# Patient Record
Sex: FEMALE | Race: ASIAN | URBAN - METROPOLITAN AREA
[De-identification: names, ages, dates, MRNs, and addresses within clinical notes are randomized per-mention and may not be internally consistent; named-entity substitution may affect disease eponyms.]

---

## 2017-06-27 ENCOUNTER — OFFICE VISIT (OUTPATIENT)
Dept: PEDIATRICS | Facility: CLINIC | Age: 32
End: 2017-06-27
Payer: COMMERCIAL

## 2017-06-27 ENCOUNTER — TELEPHONE (OUTPATIENT)
Dept: PEDIATRICS | Facility: CLINIC | Age: 32
End: 2017-06-27

## 2017-06-27 VITALS
TEMPERATURE: 97.2 F | DIASTOLIC BLOOD PRESSURE: 86 MMHG | HEART RATE: 86 BPM | BODY MASS INDEX: 26.45 KG/M2 | WEIGHT: 140.1 LBS | OXYGEN SATURATION: 100 % | HEIGHT: 61 IN | SYSTOLIC BLOOD PRESSURE: 114 MMHG

## 2017-06-27 DIAGNOSIS — R53.83 OTHER FATIGUE: ICD-10-CM

## 2017-06-27 DIAGNOSIS — E03.9 HYPOTHYROIDISM, UNSPECIFIED TYPE: Primary | ICD-10-CM

## 2017-06-27 DIAGNOSIS — L65.9 HAIR LOSS: Primary | ICD-10-CM

## 2017-06-27 LAB
BASOPHILS # BLD AUTO: 0 10E9/L (ref 0–0.2)
BASOPHILS NFR BLD AUTO: 0.5 %
DEPRECATED CALCIDIOL+CALCIFEROL SERPL-MC: 35 UG/L (ref 20–75)
DIFFERENTIAL METHOD BLD: NORMAL
EOSINOPHIL # BLD AUTO: 0.3 10E9/L (ref 0–0.7)
EOSINOPHIL NFR BLD AUTO: 4.4 %
ERYTHROCYTE [DISTWIDTH] IN BLOOD BY AUTOMATED COUNT: 12.8 % (ref 10–15)
HCT VFR BLD AUTO: 37.1 % (ref 35–47)
HGB BLD-MCNC: 12.3 G/DL (ref 11.7–15.7)
LYMPHOCYTES # BLD AUTO: 2 10E9/L (ref 0.8–5.3)
LYMPHOCYTES NFR BLD AUTO: 32.4 %
MCH RBC QN AUTO: 26.7 PG (ref 26.5–33)
MCHC RBC AUTO-ENTMCNC: 33.2 G/DL (ref 31.5–36.5)
MCV RBC AUTO: 81 FL (ref 78–100)
MONOCYTES # BLD AUTO: 0.3 10E9/L (ref 0–1.3)
MONOCYTES NFR BLD AUTO: 4.9 %
NEUTROPHILS # BLD AUTO: 3.6 10E9/L (ref 1.6–8.3)
NEUTROPHILS NFR BLD AUTO: 57.8 %
PLATELET # BLD AUTO: 221 10E9/L (ref 150–450)
RBC # BLD AUTO: 4.6 10E12/L (ref 3.8–5.2)
T4 FREE SERPL-MCNC: 0.39 NG/DL (ref 0.76–1.46)
TSH SERPL DL<=0.005 MIU/L-ACNC: 78.5 MU/L (ref 0.4–4)
WBC # BLD AUTO: 6.2 10E9/L (ref 4–11)

## 2017-06-27 PROCEDURE — 85025 COMPLETE CBC W/AUTO DIFF WBC: CPT | Performed by: NURSE PRACTITIONER

## 2017-06-27 PROCEDURE — 36415 COLL VENOUS BLD VENIPUNCTURE: CPT | Performed by: NURSE PRACTITIONER

## 2017-06-27 PROCEDURE — 84443 ASSAY THYROID STIM HORMONE: CPT | Performed by: NURSE PRACTITIONER

## 2017-06-27 PROCEDURE — 82306 VITAMIN D 25 HYDROXY: CPT | Performed by: NURSE PRACTITIONER

## 2017-06-27 PROCEDURE — 99203 OFFICE O/P NEW LOW 30 MIN: CPT | Performed by: NURSE PRACTITIONER

## 2017-06-27 PROCEDURE — 84439 ASSAY OF FREE THYROXINE: CPT | Performed by: NURSE PRACTITIONER

## 2017-06-27 RX ORDER — LEVOTHYROXINE SODIUM 100 UG/1
100 TABLET ORAL DAILY
Qty: 90 TABLET | Refills: 0 | Status: SHIPPED | OUTPATIENT
Start: 2017-06-27 | End: 2017-10-09

## 2017-06-27 NOTE — LETTER
[facility]  [address]  [place] [address]                  [phone]   June 28, 2017    Ene Ng  [address]   [place] [id number]      Dear Ene,    Here is a summary of your recent test results:    Pleasure to meet you.     Your vitamin D is normal. Your hemoglobin is normal.     Your thyroid is very low. Please re-start the medication and come in for labs in 8 weeks. I think you should be feeling much better.     Again, I would encourage your family to try to get connected with other families. I think social isolation can contribute to feelings of depression.     Your test results are enclosed.      Please contact me if you have any questions.           Thank you very much for choosing [facility]    Best regards,    Cyndi Chamberlain, CNP        Results for orders placed or performed in visit on 06/27/17   TSH with free T4 reflex   Result Value Ref Range    TSH 78.50 (H) 0.40 - 4.00 mU/L   CBC with platelets differential   Result Value Ref Range    WBC 6.2 4.0 - 11.0 10e9/L    RBC Count 4.60 3.8 - 5.2 10e12/L    Hemoglobin 12.3 11.7 - 15.7 g/dL    Hematocrit 37.1 35.0 - 47.0 %    MCV 81 78 - 100 fl    MCH 26.7 26.5 - 33.0 pg    MCHC 33.2 31.5 - 36.5 g/dL    RDW 12.8 10.0 - 15.0 %    Platelet Count 221 150 - 450 10e9/L    Diff Method Automated Method     % Neutrophils 57.8 %    % Lymphocytes 32.4 %    % Monocytes 4.9 %    % Eosinophils 4.4 %    % Basophils 0.5 %    Absolute Neutrophil 3.6 1.6 - 8.3 10e9/L    Absolute Lymphocytes 2.0 0.8 - 5.3 10e9/L    Absolute Monocytes 0.3 0.0 - 1.3 10e9/L    Absolute Eosinophils 0.3 0.0 - 0.7 10e9/L    Absolute Basophils 0.0 0.0 - 0.2 10e9/L   Vitamin D Deficiency   Result Value Ref Range    Vitamin D Deficiency screening 35 20 - 75 ug/L   T4 free   Result Value Ref Range    T4 Free 0.39 (L) 0.76 - 1.46 ng/dL

## 2017-06-27 NOTE — MR AVS SNAPSHOT
"              After Visit Summary   2017    Ene Ng    MRN: [id number]           Patient Information     Date Of Birth          1985        Visit Information        Provider Department      2017 8:20 AM Cyndi Chamberlain APRN CNP [facility] [place]        Today's Diagnoses     Hair loss    -  1    Other fatigue           Follow-ups after your visit        Who to contact     If you have questions or need follow up information about today's clinic visit or your schedule please contact [facility] directly at [phone].  Normal or non-critical lab and imaging results will be communicated to you by [employer] Inchart, letter or phone within 4 business days after the clinic has received the results. If you do not hear from us within 7 days, please contact the clinic through [employer] Inchart or phone. If you have a critical or abnormal lab result, we will notify you by phone as soon as possible.  Submit refill requests through [employer] or call your pharmacy and they will forward the refill request to us. Please allow 3 business days for your refill to be completed.          Additional Information About Your Visit        MyChart Information     [employer] lets you send messages to your doctor, view your test results, renew your prescriptions, schedule appointments and more. To sign up, go to [email or web address] . Click on \"Log in\" on the left side of the screen, which will take you to the Welcome page. Then click on \"Sign up Now\" on the right side of the page.     You will be asked to enter the access code listed below, as well as some personal information. Please follow the directions to create your username and password.     Your access code is: [email or web address]  Expires: 2017  9:44 AM     Your access code will  in 90 days. If you need help or a new code, please call your [facility] or [phone].        Care EveryWhere ID     This is your Care EveryWhere ID. This could be used " "by other organizations to access your [place] medical records  [id number]        Your Vitals Were     Pulse Temperature Height Last Period Pulse Oximetry Breastfeeding?    86 97.2  F (36.2  C) (Tympanic) 5' 0.5\" (1.537 m) 06/17/2017 (Approximate) 100% No    BMI (Body Mass Index)                   26.91 kg/m2            Blood Pressure from Last 3 Encounters:   06/27/17 114/86    Weight from Last 3 Encounters:   06/27/17 140 lb 1.6 oz (63.5 kg)              We Performed the Following     CBC with platelets differential     TSH with free T4 reflex     Vitamin D Deficiency        Primary Care Provider    None Specified       No primary provider on file.        Equal Access to Services     JOVANA HICKS : Jamin Ruiz, roger johnston, raymon matthews, lorenzo zee . So [facility] [phone].    ATENCIÓN: Si habla español, tiene a dow disposición servicios gratuitos de asistencia lingüística. Llame al [phone].    We comply with applicable federal civil rights laws and [place] laws. We do not discriminate on the basis of race, color, national origin, age, disability sex, sexual orientation or gender identity.            Thank you!     Thank you for choosing [facility] [place]  for your care. Our goal is always to provide you with excellent care. Hearing back from our patients is one way we can continue to improve our services. Please take a few minutes to complete the written survey that you may receive in the mail after your visit with us. Thank you!             Your Updated Medication List - Protect others around you: Learn how to safely use, store and throw away your medicines at www.disposemymeds.org.          This list is accurate as of: 6/27/17  9:44 AM.  Always use your most recent med list.                   Brand Name Dispense Instructions for use Diagnosis    SYNTHROID PO      Take 88 mcg by mouth daily    Hair loss         "

## 2017-06-27 NOTE — PROGRESS NOTES
"  SUBJECTIVE:                                                    Ene Ng is a 31 year old female who presents to clinic today with  & son for the following health issues:    Thyroid problem  Losing hair2      Duration: 4 years, in 2013- Thryoid, Past 6 months- loss of hair    Description (location/character/radiation): Pt states that she feels depressed. Pt's  states that she has lot quite a bit of hair that is noticeable. Patient also states that she has some pain on her head and is itchy at times.     Intensity:  6/10- when head is hurting from hairloss    Accompanying signs and symptoms: heaches with durations lasting from 1-5 hours. Patient takes ibuprofen to alleviate pain.     History (similar episodes/previous evaluation): None    Precipitating or alleviating factors: None     Therapies tried and outcome: patient has used ibuprofen for headaches and coconut oil for itchy head and headache. Outcome-effective.   Hx hypothyroidism for several years. Stopped thyroid medicine 1 month ago because she ran out. However, since childbirth she has been fatigued and has gained weight.    Headaches are mild, twice per week.      ROS: const/derm/neuro otherwise negative     OBJECTIVE:  /86 (BP Location: Right arm, Cuff Size: Adult Regular)  Pulse 86  Temp 97.2  F (36.2  C) (Tympanic)  Ht 5' 0.5\" (1.537 m)  Wt 140 lb 1.6 oz (63.5 kg)  LMP 06/17/2017 (Approximate)  SpO2 100%  Breastfeeding? No  BMI 26.91 kg/m2  CONSTITUTIONAL: Alert, well-nourished, well-groomed, NAD  RESP: Lungs CTA. No wheeze, rhonchi, rales.  CV: HRRR S1 S2 No MRG. No peripheral edema  GI: Abdomen flat. BS x 4. No TTP. No HSM or masses. No CVAT  Neck: No lymphadenopathy or masses. Thyroid smooth, non-tender, and non-enlarged.      ASSESSMENT/PLAN:  (L65.9) Hair loss  (primary encounter diagnosis)  Comment: Likely telogen effluvium secondary to childbirth. Low thyroid hormones could also contribute. No evidence of " large areas of alopecia or scalp infection.   Plan: Levothyroxine Sodium (SYNTHROID PO), TSH with         free T4 reflex, CBC with platelets differential            (R53.83) Other fatigue  Comment: DDX includes postpartum depression, hypothyroidism, anemia, vitamin D deficiency, and sleep deprivation.   Plan: TSH with free T4 reflex, CBC with platelets         differential        F/U 1 month to re-evaluate for depression. Discussed ways to connect her socially with friends and family as she is socially isolated.           Cyndi Chamberlain, FNP-DNP.

## 2017-06-27 NOTE — NURSING NOTE
"Chief Complaint   Patient presents with     Thyroid Problem     losing hair       Initial /86 (BP Location: Right arm, Cuff Size: Adult Regular)  Pulse 86  Temp 97.2  F (36.2  C) (Tympanic)  Ht 5' 0.5\" (1.537 m)  Wt 140 lb 1.6 oz (63.5 kg)  LMP 06/17/2017 (Approximate)  SpO2 100%  Breastfeeding? No  BMI 26.91 kg/m2 Estimated body mass index is 26.91 kg/(m^2) as calculated from the following:    Height as of this encounter: 5' 0.5\" (1.537 m).    Weight as of this encounter: 140 lb 1.6 oz (63.5 kg).  Medication Reconciliation: complete   SMA George  "

## 2017-06-27 NOTE — TELEPHONE ENCOUNTER
Please call and let patient know thyroid is very low. Needs to re-start thyroid meds at 100mcg this time. Take EVERY day on empty stomach. Re-check in 8 weeks.    I'll get back to her about the other labs.     Mello,    Cyndi Chamberlain, FNP-DNP.

## 2017-06-28 ASSESSMENT — PATIENT HEALTH QUESTIONNAIRE - PHQ9: SUM OF ALL RESPONSES TO PHQ QUESTIONS 1-9: 10

## 2017-06-28 NOTE — TELEPHONE ENCOUNTER
I LVM to CB if patient calls back please tell her the info bellow per Cyndi Chamberlain.    Jennyfer Marcelino MA

## 2017-08-03 ENCOUNTER — TELEPHONE (OUTPATIENT)
Dept: PEDIATRICS | Facility: CLINIC | Age: 32
End: 2017-08-03

## 2017-08-03 NOTE — TELEPHONE ENCOUNTER
Panel Management Review      Patient has the following on her problem list: None      Composite cancer screening  Chart review shows that this patient is due/due soon for the following Pap Smear  Summary:    Patient is due/failing the following:   PAP    Action needed:   Patient needs office visit for pap.    Type of outreach:    Phone, spoke to patient.  Patient states had pap when pregnant at [facility] in [place] December 2016 - normal result.    Questions for provider review:    None                                                                                                                                    Delia Wylie CMA    Chart closed .

## 2017-10-09 ENCOUNTER — TELEPHONE (OUTPATIENT)
Dept: PEDIATRICS | Facility: CLINIC | Age: 32
End: 2017-10-09

## 2017-10-09 ENCOUNTER — OFFICE VISIT (OUTPATIENT)
Dept: PEDIATRICS | Facility: CLINIC | Age: 32
End: 2017-10-09
Payer: COMMERCIAL

## 2017-10-09 VITALS
DIASTOLIC BLOOD PRESSURE: 62 MMHG | HEIGHT: 61 IN | TEMPERATURE: 97.4 F | SYSTOLIC BLOOD PRESSURE: 98 MMHG | HEART RATE: 86 BPM | BODY MASS INDEX: 26.19 KG/M2 | OXYGEN SATURATION: 99 % | WEIGHT: 138.7 LBS

## 2017-10-09 DIAGNOSIS — E03.9 HYPOTHYROIDISM, UNSPECIFIED TYPE: ICD-10-CM

## 2017-10-09 DIAGNOSIS — Z23 NEED FOR PROPHYLACTIC VACCINATION AND INOCULATION AGAINST INFLUENZA: ICD-10-CM

## 2017-10-09 DIAGNOSIS — S00.83XA CONTUSION OF FACE, INITIAL ENCOUNTER: ICD-10-CM

## 2017-10-09 DIAGNOSIS — F32.9 SINGLE CURRENT EPISODE OF MAJOR DEPRESSIVE DISORDER, UNSPECIFIED DEPRESSION EPISODE SEVERITY: ICD-10-CM

## 2017-10-09 DIAGNOSIS — R53.83 OTHER FATIGUE: Primary | ICD-10-CM

## 2017-10-09 PROCEDURE — 90686 IIV4 VACC NO PRSV 0.5 ML IM: CPT | Performed by: NURSE PRACTITIONER

## 2017-10-09 PROCEDURE — 90471 IMMUNIZATION ADMIN: CPT | Performed by: NURSE PRACTITIONER

## 2017-10-09 PROCEDURE — 99214 OFFICE O/P EST MOD 30 MIN: CPT | Mod: 25 | Performed by: NURSE PRACTITIONER

## 2017-10-09 RX ORDER — LEVOTHYROXINE SODIUM 100 UG/1
100 TABLET ORAL DAILY
Qty: 90 TABLET | Refills: 0 | Status: SHIPPED | OUTPATIENT
Start: 2017-10-09

## 2017-10-09 ASSESSMENT — PATIENT HEALTH QUESTIONNAIRE - PHQ9: SUM OF ALL RESPONSES TO PHQ QUESTIONS 1-9: 15

## 2017-10-09 NOTE — TELEPHONE ENCOUNTER
Called and left VM to contact clinic with which language is their native language in [place].  Left message that it is for updating records.  Delia Wylie, CMA

## 2017-10-09 NOTE — TELEPHONE ENCOUNTER
The only language listed in her chart is English. I do not know which Ivorian language they speak.  How do you want to handle this?  Delia Wylie, CMA

## 2017-10-09 NOTE — MR AVS SNAPSHOT
After Visit Summary   10/9/2017    Ene Ng    MRN: [id number]           Patient Information     Date Of Birth          1985        Visit Information        Provider Department      10/9/2017 1:40 PM Cyndi Chamberlain APRN [facility]        Today's Diagnoses     Other fatigue    -  1    Need for prophylactic vaccination and inoculation against influenza        Hypothyroidism, unspecified type          Care Instructions    1. Take the thyroid medicine  2. Take vitamin D3 2,000 iu per day        Pterygium    A pterygium is a noncancerous growth that starts in the clear, thin tissue over the eye (conjunctiva). This fleshy growth covers the white part of the eye (sclera) and grows onto the clear part of the eye (cornea). One or both eyes may be affected. A pterygium may have no symptoms, or it may cause eye irritation. This may include itching and redness. If the growth is large, it can cause vision problems. Most of the time a pterygium is harmless, and no treatment is needed. If vision is affected, the growth should be removed.  It is not known exactly what causes a pterygium. Exposure of the eyes to the sun (UV-light exposure), dust, wind, and low humidity are thought to be factors. People who spend a lot of time in the sun, or who live in places where the sun s rays are intense, may have an increased risk of developing pterygia. To protect your eyes, wear sunglasses with side shields that block 100% of UV rays when outside. This may help prevent the condition from coming back or getting worse. It is also helpful to wear a wide-brimmed hat when outside.  Home care  If you have a pterygium, over-the-counter eye drops may be recommended. These help soothe inflammation and dryness.  Follow-up care  Follow up with your healthcare provider, or as advised. If you notice changes in your vision, contact an eye doctor (ophthalmologist) right away.  When to seek medical  advice  Call your healthcare provider right away if any of these occur:    Changes in your vision    Bleeding from your eyes    Eye pain    New eye problems  Date Last Reviewed: 6/14/2015 2000-2017 The [employer]. [address] [place] [address], [place] [address]. All rights reserved. This information is not intended as a substitute for professional medical care. Always follow your healthcare professional's instructions.        Treatment for a Pterygium     Sun protection, such as sunglasses and hats, can help prevent a pterygium.     A pterygium ([id number]-GCR-nb-um) is a type of growth on the eye. It is not cancer. It is most often only a minor problem unless it causes changes in your eyesight. Pterygia (the plural form of the word) are common in mg climates and in people who do outdoor work.  Types of treatment  If your pterygium is not causing any symptoms, it may not need treatment. If symptoms develop, your doctor might treat you with:    Over-the-counter eye drops, eye gel, or eye ointment to help with redness or irritation    Prescription eye drops, eye gel, or eye ointment    Surgery  Only surgery can remove a pterygium. But other treatments may help ease symptoms. Your doctor may be more likely to advise surgery if:    The pterygium is causing eyesight problems    The pterygium is getting larger    You can t move your eye normally    You have severe eye irritation that won t go away with other treatment    Your eye s appearance bothers you a lot  A pterygium will often grow back after it s removed with surgery. This may be more likely if you are under age 40. In some cases the pterygium that grows back can cause worse symptoms than the first one. It may be even more difficult to remove the new pterygium. This is why a pterygium is not often removed with surgery unless it causes severe symptoms.  A pterygium may be less likely to return if you have other treatments in addition to surgery. These  treatments slow the growth of your cells in the area and may help prevent future growth there. The treatments include mitomycin C (MMC) and beta irradiation. These treatments have their own risks. Your doctor will talk with you about the risks and benefits of all treatments.  Preventing a pterygium  Not all pterygia can be prevented. But you can decrease your risk by reducing sun exposure to your eyes. Wear sunglasses and hats when you are outside. Make sure your sunglasses block both ultraviolet A and ultraviolet B rays.  When to call your healthcare provider  Call your healthcare provider right away if you have any of these:    Decreased eyesight    Symptoms that get worse    New symptoms   Date Last Reviewed: 8/10/2015    2781-7622 [employer]. [address] [place] [address], [place] [id number]. All rights reserved. This information is not intended as a substitute for professional medical care. Always follow your healthcare professional's instructions.                Follow-ups after your visit        Who to contact     If you have questions or need follow up information about today's clinic visit or your schedule please contact [facility] directly at [phone].  Normal or non-critical lab and imaging results will be communicated to you by [employer], letter or phone within 4 business days after the clinic has received the results. If you do not hear from us within 7 days, please contact the clinic through [employer] or phone. If you have a critical or abnormal lab result, we will notify you by phone as soon as possible.  Submit refill requests through [employer] or call your pharmacy and they will forward the refill request to us. Please allow 3 business days for your refill to be completed.          Additional Information About Your Visit        [employer] Information     [employer] lets you send messages to your doctor, view your test results, renew your prescriptions, schedule appointments and more. To  "sign up, go to [email or web address] . Click on \"Log in\" on the left side of the screen, which will take you to the Welcome page. Then click on \"Sign up Now\" on the right side of the page.     You will be asked to enter the access code listed below, as well as some personal information. Please follow the directions to create your username and password.     Your access code is: [email or web address]  Expires: 2018  2:27 PM     Your access code will  in 90 days. If you need help or a new code, please call your [place] clinic or [phone].        Care EveryWhere ID     This is your Care EveryWhere ID. This could be used by other organizations to access your [place] medical records  [id number]        Your Vitals Were     Pulse Temperature Height Pulse Oximetry BMI (Body Mass Index)       86 97.4  F (36.3  C) (Tympanic) 5' 0.5\" (1.537 m) 99% 26.64 kg/m2        Blood Pressure from Last 3 Encounters:   10/09/17 98/62   17 114/86    Weight from Last 3 Encounters:   10/09/17 138 lb 11.2 oz (62.9 kg)   17 140 lb 1.6 oz (63.5 kg)              We Performed the Following     FLU VAC, SPLIT VIRUS IM > 3 YO (QUADRIVALENT) [12810]     Vaccine Administration, Initial [69439]          Where to get your medicines      These medications were sent to [id number]  [place], [place] - [address]  AT [address]  [address] [place] [address]     Phone:  [phone]     levothyroxine 100 MCG tablet          Primary Care Provider    None Specified       No primary provider on file.        Equal Access to Services     JOVANA HICKS : roger Vee, lorenzo coello. So [facility] [phone].    ATENCIÓN: Si habla español, tiene a dow disposición servicios gratuitos de asistencia lingüística. Lltran al [phone].    We comply with applicable federal civil rights laws and [place] laws. We do not " discriminate on the basis of race, color, national origin, age, disability, sex, sexual orientation, or gender identity.            Thank you!     Thank you for choosing [facility] [place]  for your care. Our goal is always to provide you with excellent care. Hearing back from our patients is one way we can continue to improve our services. Please take a few minutes to complete the written survey that you may receive in the mail after your visit with us. Thank you!             Your Updated Medication List - Protect others around you: Learn how to safely use, store and throw away your medicines at www.disposemymeds.org.          This list is accurate as of: 10/9/17  2:27 PM.  Always use your most recent med list.                   Brand Name Dispense Instructions for use Diagnosis    * SYNTHROID PO      Take 88 mcg by mouth daily    Hair loss       * levothyroxine 100 MCG tablet    SYNTHROID/LEVOTHROID    90 tablet    Take 1 tablet (100 mcg) by mouth daily    Hypothyroidism, unspecified type       * Notice:  This list has 2 medication(s) that are the same as other medications prescribed for you. Read the directions carefully, and ask your doctor or other care provider to review them with you.

## 2017-10-09 NOTE — TELEPHONE ENCOUNTER
Patient seen in clinic yesterday with a bruise on her face: Through her hubby as  he says that she fell. I wanted to call her back tomorrow via  and talk to her while her  is at work. I want to ask the standard safety questions.    Please huddle with me Tuesday morning to find a good time. I'd like staff to call through  then transfer the call to me so we can discuss.

## 2017-10-09 NOTE — NURSING NOTE
"Chief Complaint   Patient presents with     Musculoskeletal Problem     \"bone pain\"       Initial BP 98/62 (Cuff Size: Adult Regular)  Pulse 86  Temp 97.4  F (36.3  C) (Tympanic)  Ht 5' 0.5\" (1.537 m)  Wt 138 lb 11.2 oz (62.9 kg)  SpO2 99%  BMI 26.64 kg/m2 Estimated body mass index is 26.64 kg/(m^2) as calculated from the following:    Height as of this encounter: 5' 0.5\" (1.537 m).    Weight as of this encounter: 138 lb 11.2 oz (62.9 kg).  Medication Reconciliation: complete   Delia Wylie CMA    "

## 2017-10-09 NOTE — TELEPHONE ENCOUNTER
Please call [facility] and ask all languages they speak (likely speak Mandaeism and another local language). Please let them know it is for our records

## 2017-10-09 NOTE — PROGRESS NOTES
SUBJECTIVE:   Ene Ng is a 32 year old female who presents to clinic today for the following health issues:    Is not taking levothyroxine - thinks it is too high dose.    Musculoskeletal problem/pain      Duration: 1 week    Description  Location: shoulders, arms - wondering if related to thyroid, fatigue    Intensity:  moderate    Accompanying signs and symptoms: pain when lifting any weight - otherwise no pain    History  Previous similar problem: no   Previous evaluation:  none    Precipitating or alleviating factors:  Trauma or overuse: no   Aggravating factors include: lifting    Therapies tried and outcome: nothing: Symptoms not alleviated      Patient reports all information via  acting as .   reports that patient used to cry a lot, even before having a baby.   Since having a baby, she cries every day. She has no friends here. She med once with her 's friend's wife who also speaks her language and has a small child. No family here. She apparently feels strongly that her  doesn't spend enough time with she and her infant son. Reports he works every day until about 5 but then takes phone calls (not work related), watches movies, and doesn't listen to her when she is talking to him during a movie. He states he knows this is his fault and he can fix it. He works about 40 hours per week. She reports her  is the only one she wants to spend time with.     States she has thoughts that she would be better off dead but has never thought about a plan whatsoever. States she would never do that to her family.    Hasn't been taking vitamin D.    Stopped thyroid medicine 10 days ago because she wasn't sure she was supposed to be taking that dose. Has been feeling extremely tired since then.    States she fell down the stairs and hit her face, which is why she has the bruising on her face.     ROS: const/endo/pscyh otherwise negative     OBJECTIVE:  BP 98/62 (Cuff  "Size: Adult Regular)  Pulse 86  Temp 97.4  F (36.3  C) (Tympanic)  Ht 5' 0.5\" (1.537 m)  Wt 138 lb 11.2 oz (62.9 kg)  SpO2 99%  BMI 26.64 kg/m2  CONSTITUTIONAL: Alert, well-nourished, well-groomed, NAD  RESP: Lungs CTA. No wheeze, rhonchi, rales.  CV: HRRR S1 S2 No MRG. No peripheral edema  PSYCH: Flat affect. Appears irritable. Appropriate mentation and speech.   DERM: Right sided facial bruise over right cheek and temple, about 3.5 cm x 3.5 cm. Appears to be almost petechiae.     ASSESSMENT/PLAN:  (R53.83) Other fatigue  (primary encounter diagnosis)  Comment: Patient with fatigue for 10 days, acute on chronic fatigue. Likely due to stopping her levothyroxine 10 days ago. However, I think this patient has other contributing factors including depression, which was diagnosed today. See below. Also with borderline low vitamin D, which I have instructed her to start replacing.   Plan: Education regarding levothyroxine dosing. Re-start today.  Start 2,000 iu vitamin D3 per day.  Depression as below.  F/U 1 week.     (E03.9) Hypothyroidism, unspecified type  Comment: As above.  Plan: levothyroxine (SYNTHROID/LEVOTHROID) 100 MCG         tablet            (F32.9) Single current episode of major depressive disorder, unspecified depression episode severity  Comment: New diagnosis today with PHQ15. She has thoughts of being better off dead but states she would never kill herself and never thinks about a specific plan to hurt herself. The majority of her depression sx seem to be fixated on not spending enough time with her . See above. She seems to be very socially isolated.   Plan: Recommended therapy-pt declined  Strongly recommended building her social network.  I'm a bit concerned about her relationship with her . Given bruising on her face I'd like to call her tomorrow when not with her  and make sure she feels safe at home.     (S00.83XA) Contusion of face, initial encounter  Comment: Per " patient, fell down the stairs, which is a feasible story. Unable to assess for safety at home due to  being the .  Plan: Will call with  tomorrow to assess for safety via .           MADHURI De Souza-RUFINO.            Injectable Influenza Immunization Documentation    1.  Is the person to be vaccinated sick today?   No    2. Does the person to be vaccinated have an allergy to a component   of the vaccine?   No    3. Has the person to be vaccinated ever had a serious reaction   to influenza vaccine in the past?   No    4. Has the person to be vaccinated ever had Guillain-Barré syndrome?   No    Form completed by Delia Wylie [facility]

## 2017-10-10 NOTE — TELEPHONE ENCOUNTER
Called and left VM message to contact clinic with native language and get another contact number - does Gema have cell phone of her own?  Delia Wylie, CMA

## 2017-10-19 PROBLEM — F32.9 SINGLE CURRENT EPISODE OF MAJOR DEPRESSIVE DISORDER, UNSPECIFIED DEPRESSION EPISODE SEVERITY: Status: ACTIVE | Noted: 2017-10-19

## 2017-10-19 PROBLEM — E03.9 HYPOTHYROIDISM, UNSPECIFIED TYPE: Status: ACTIVE | Noted: 2017-10-09

## 2017-10-19 PROBLEM — E03.9 HYPOTHYROIDISM, UNSPECIFIED TYPE: Status: ACTIVE | Noted: 2017-10-19

## 2017-12-12 ENCOUNTER — OFFICE VISIT (OUTPATIENT)
Dept: PEDIATRICS | Facility: CLINIC | Age: 32
End: 2017-12-12
Payer: COMMERCIAL

## 2017-12-12 VITALS
OXYGEN SATURATION: 99 % | HEART RATE: 94 BPM | BODY MASS INDEX: 26.38 KG/M2 | HEIGHT: 61 IN | TEMPERATURE: 97.7 F | WEIGHT: 139.7 LBS | DIASTOLIC BLOOD PRESSURE: 62 MMHG | SYSTOLIC BLOOD PRESSURE: 102 MMHG

## 2017-12-12 DIAGNOSIS — N94.6 PAINFUL MENSTRUAL PERIODS: ICD-10-CM

## 2017-12-12 DIAGNOSIS — R10.11 RUQ ABDOMINAL PAIN: ICD-10-CM

## 2017-12-12 DIAGNOSIS — E03.9 HYPOTHYROIDISM, UNSPECIFIED TYPE: ICD-10-CM

## 2017-12-12 DIAGNOSIS — F32.A DEPRESSION, UNSPECIFIED DEPRESSION TYPE: Primary | ICD-10-CM

## 2017-12-12 PROCEDURE — 99215 OFFICE O/P EST HI 40 MIN: CPT | Performed by: NURSE PRACTITIONER

## 2017-12-12 RX ORDER — IBUPROFEN 800 MG/1
800 TABLET, FILM COATED ORAL EVERY 8 HOURS PRN
Qty: 90 TABLET | Refills: 1 | Status: SHIPPED | OUTPATIENT
Start: 2017-12-12

## 2017-12-12 ASSESSMENT — PATIENT HEALTH QUESTIONNAIRE - PHQ9: SUM OF ALL RESPONSES TO PHQ QUESTIONS 1-9: 17

## 2017-12-12 NOTE — PATIENT INSTRUCTIONS
-[facility] Radiology Scheduling [phone] (2 ultrasound orders)  -Need family therapy  -800 mg ibuprofen tablet 3-4 times a day with period (only 1 tab at a time)

## 2017-12-12 NOTE — NURSING NOTE
"Chief Complaint   Patient presents with     Abdominal Pain       Initial /62 (Cuff Size: Adult Regular)  Pulse 94  Temp 97.7  F (36.5  C) (Tympanic)  Ht 5' 0.5\" (1.537 m)  Wt 139 lb 11.2 oz (63.4 kg)  LMP 12/08/2017 (Exact Date)  SpO2 99%  BMI 26.83 kg/m2 Estimated body mass index is 26.83 kg/(m^2) as calculated from the following:    Height as of this encounter: 5' 0.5\" (1.537 m).    Weight as of this encounter: 139 lb 11.2 oz (63.4 kg).  Medication Reconciliation: complete   Delia Wylie CMA    "
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
## 2017-12-12 NOTE — MR AVS SNAPSHOT
After Visit Summary   12/12/2017    Ene Ng    MRN: [id number]           Patient Information     Date Of Birth          1985        Visit Information        Provider Department      12/12/2017 2:20 PM Cyndi Chamberlain APRN CNP [facility]        Today's Diagnoses     Painful menstrual periods    -  1    Hypothyroidism, unspecified type        Depression, unspecified depression type        RUQ abdominal pain          Care Instructions    -[facility] Radiology Scheduling [phone] (2 ultrasound orders)  -Need family therapy  -800 mg ibuprofen tablet 3-4 times a day with period (only 1 tab at a time)          Follow-ups after your visit        Additional Services     MENTAL HEALTH REFERRAL  - Adult; Outpatient Treatment; Individual/Couples/Family/Group Therapy/Health Psychology; [facility] [place] [facility] [phone]; We will contact you to schedule the appointment or please call with any questions       All scheduling is subject to the client's specific insurance plan & benefits, provider/location availability, and provider clinical specialities.  Please arrive 15 minutes early for your first appointment and bring your completed paperwork.    Please be aware that coverage of these services is subject to the terms and limitations of your health insurance plan.  Call member services at your health plan with any benefit or coverage questions.                            Future tests that were ordered for you today     Open Future Orders        Priority Expected Expires Ordered    US Abdomen Limited Routine  12/12/2018 12/12/2017    US Pelvic Complete w Transvaginal Routine  12/12/2018 12/12/2017    TSH with free T4 reflex Routine  12/12/2018 12/12/2017            Who to contact     If you have questions or need follow up information about today's clinic visit or your schedule please contact [facility] directly at [phone].  Normal or non-critical lab  "and imaging results will be communicated to you by MyChart, letter or phone within 4 business days after the clinic has received the results. If you do not hear from us within 7 days, please contact the clinic through [employer] or phone. If you have a critical or abnormal lab result, we will notify you by phone as soon as possible.  Submit refill requests through [employer] or call your pharmacy and they will forward the refill request to us. Please allow 3 business days for your refill to be completed.          Additional Information About Your Visit        [employer] Information     [employer] lets you send messages to your doctor, view your test results, renew your prescriptions, schedule appointments and more. To sign up, go to [email or web address] . Click on \"Log in\" on the left side of the screen, which will take you to the Welcome page. Then click on \"Sign up Now\" on the right side of the page.     You will be asked to enter the access code listed below, as well as some personal information. Please follow the directions to create your username and password.     Your access code is: [email or web address]  Expires: 2018  1:27 PM     Your access code will  in 90 days. If you need help or a new code, please call your [place] clinic or [phone].        Care EveryWhere ID     This is your Care EveryWhere ID. This could be used by other organizations to access your [place] medical records  [id number]        Your Vitals Were     Pulse Temperature Height Last Period Pulse Oximetry BMI (Body Mass Index)    94 97.7  F (36.5  C) (Tympanic) 5' 0.5\" (1.537 m) 2017 (Exact Date) 99% 26.83 kg/m2       Blood Pressure from Last 3 Encounters:   17 102/62   10/09/17 98/62   17 114/86    Weight from Last 3 Encounters:   17 139 lb 11.2 oz (63.4 kg)   10/09/17 138 lb 11.2 oz (62.9 kg)   17 140 lb 1.6 oz (63.5 kg)              We Performed the Following     Comprehensive metabolic panel     Lipase     " MENTAL HEALTH REFERRAL  - Adult; Outpatient Treatment; Individual/Couples/Family/Group Therapy/Health Psychology; [facility] [place]: [facility] [phone]; We will contact you to schedule the appointment or please call with any questions          Today's Medication Changes          These changes are accurate as of: 12/12/17  3:31 PM.  If you have any questions, ask your nurse or doctor.               Start taking these medicines.        Dose/Directions    ibuprofen 800 MG tablet   Commonly known as:  ADVIL/MOTRIN   Used for:  Painful menstrual periods   Started by:  Cyndi Chamberlain APRN CNP        Dose:  800 mg   Take 1 tablet (800 mg) by mouth every 8 hours as needed for moderate pain   Quantity:  90 tablet   Refills:  1            Where to get your medicines      These medications were sent to [id number] - [place] - [address]  AT [address] [facility] [address]  [address] [place] [address]     Phone:  [phone]     ibuprofen 800 MG tablet                Primary Care Provider Office Phone # Fax #    ARABELLA Rothman [phone] [address] [place] [address]        Equal Access to Services     [facility]: Hadii aad ku hadasho Soomaali, waaxda luqadaha, qaybta kaalmada adeegyada, lorenzo zee . So [facility] [phone].    ATENCIÓN: Si habla español, tiene a dow disposición servicios gratuitos de asistencia lingüística. [facility] [phone].    We comply with applicable federal civil rights laws and [place] laws. We do not discriminate on the basis of race, color, national origin, age, disability, sex, sexual orientation, or gender identity.            Thank you!     [facility] you [facility]  [facility] your [facility]. Our goal is always to provide you with excellent care. Hearing back from our patients is one way we can continue to improve our services. Please take a few  minutes to complete the written survey that you may receive in the mail after your visit with us. Thank you!             Your Updated Medication List - Protect others around you: Learn how to safely use, store and throw away your medicines at www.disposemymeds.org.          This list is accurate as of: 12/12/17  3:31 PM.  Always use your most recent med list.                   Brand Name Dispense Instructions for use Diagnosis    ibuprofen 800 MG tablet    ADVIL/MOTRIN    90 tablet    Take 1 tablet (800 mg) by mouth every 8 hours as needed for moderate pain    Painful menstrual periods       * SYNTHROID PO      Take 88 mcg by mouth daily    Hair loss       * levothyroxine 100 MCG tablet    SYNTHROID/LEVOTHROID    90 tablet    Take 1 tablet (100 mcg) by mouth daily    Hypothyroidism, unspecified type       * Notice:  This list has 2 medication(s) that are the same as other medications prescribed for you. Read the directions carefully, and ask your doctor or other care provider to review them with you.

## 2017-12-13 ENCOUNTER — TELEPHONE (OUTPATIENT)
Dept: PEDIATRICS | Facility: CLINIC | Age: 32
End: 2017-12-13

## 2017-12-13 NOTE — TELEPHONE ENCOUNTER
Please call patient's  (patient doesn't speak English).    Please ask him if his wife could come at 10:50 instead of 11:20 next Monday, because I'd like more time with them. If so, please put her in the 11am slot and block her for 40 minutes. Please put in the appointment notes to confirm her for 10:50 instead of 11 (they usually run 10-15 minutes late).    Thanks!    Cyndi

## 2017-12-14 NOTE — TELEPHONE ENCOUNTER
Called and left VM to have patient call to change time to earlier - 11:00 so doctor can have more time with patient.  Please ask patient to come at 10:50 and then put appointment in 11:00 spot for 40 minutes.  See provider direction below.  Delia Wylie, CMA

## 2017-12-18 ENCOUNTER — OFFICE VISIT (OUTPATIENT)
Dept: PEDIATRICS | Facility: CLINIC | Age: 32
End: 2017-12-18
Payer: COMMERCIAL

## 2017-12-18 VITALS
WEIGHT: 139.4 LBS | HEIGHT: 61 IN | SYSTOLIC BLOOD PRESSURE: 112 MMHG | OXYGEN SATURATION: 99 % | TEMPERATURE: 97.3 F | DIASTOLIC BLOOD PRESSURE: 70 MMHG | HEART RATE: 88 BPM | BODY MASS INDEX: 26.32 KG/M2

## 2017-12-18 DIAGNOSIS — F43.21 ADJUSTMENT DISORDER WITH DEPRESSED MOOD: Primary | ICD-10-CM

## 2017-12-18 PROCEDURE — 99215 OFFICE O/P EST HI 40 MIN: CPT | Performed by: NURSE PRACTITIONER

## 2017-12-18 RX ORDER — ESCITALOPRAM OXALATE 5 MG/1
5 TABLET ORAL DAILY
Qty: 60 TABLET | Refills: 0 | Status: SHIPPED | OUTPATIENT
Start: 2017-12-18

## 2017-12-18 NOTE — PATIENT INSTRUCTIONS
-Please take the medicine in the morning every morning  -Please make sure you are eating at least 3 times per day  -Consider taking English lessons  -Please see couples therapy    -See me in 1 month

## 2017-12-18 NOTE — PROGRESS NOTES
"  SUBJECTIVE:   Ene Ng is a 32 year old female who presents to clinic today for the following health issues:    Patient here today for follow up.  History via phone .   Has felt sad for about 3 years. Started after she got . They were apart for about a year after they got  then moved in together.  She is frustrated because she doesn't think her  meets his responsibilities, pays enough attention to her, or shares his thoughts enough with her. Her main complaint is that her  doesn't respond the first time she talks/tells him something. Has to say something several times before he will responds. Was also very sad when he didn't bring her mom the [place] with them. He doesn't tell her why this didn't happen. Feels he doesn't share enough with her. She would be agreeable to things as long as he communicates better with her. She is also very frustrated that he doesn't eat properly and that he smokes. Her father didn't take care of himself and he  of a heart attack. She also worries that he has some financial issues but he doesn't share with her. Feels back to normal when her  is taking care of her well. Feels safe at home-.  Notes when she gest frustrated she will scratch herself or hit herself excessively. She does this both to get his attention and to relieve her stress. Feels this is an arranged marriage but she also loves him very much. Wants her  to share this thoughts with her. No SI or HI      ROS: const/psych/resp/gyn otherwise negative     OBJECTIVE:  /70 (Cuff Size: Adult Regular)  Pulse 88  Temp 97.3  F (36.3  C) (Tympanic)  Ht 5' 0.5\" (1.537 m)  Wt 139 lb 6.4 oz (63.2 kg)  LMP 2017 (Exact Date)  SpO2 99%  BMI 26.78 kg/m2  CONSTITUTIONAL: Alert, well-nourished, well-groomed, NAD  RESP: Lungs CTA. No wheeze, rhonchi, rales.  CV: HRRR S1 S2 No MRG. No peripheral edema  PSYCH: Bright affect. Appropriate mentation and speech. "       ASSESSMENT/PLAN:  (F43.21) Adjustment disorder with depressed mood  (primary encounter diagnosis)  Comment: See above for very detailed history. I think, while this patient does have some situational stressors, she also seems to have some underlying depression and possible anxiety.   Plan: escitalopram (LEXAPRO) 5 MG tablet        She agrees to start Lexapro. Discussed r/b/se.      Patient Instructions   -Please take the medicine in the morning every morning  -Please make sure you are eating at least 3 times per day to maintain good energy and mood  -Consider taking English lessons to help reduce social isolation.  -Please see couples therapy ASAP    -See me in 1 month      50 minutes spent with patient, over 1/2 in counseling and coordination of care regarding the above diagnoses.     Cyndi Chamberlain, FNP-DNP.

## 2017-12-18 NOTE — MR AVS SNAPSHOT
"              After Visit Summary   12/18/2017    Ene Ng    MRN: [id number]           Patient Information     Date Of Birth          1985        Visit Information        Provider Department      12/18/2017 10:05 AM Cyndi Chamberlain APRN CNP; PHONE,  [facility]        Today's Diagnoses     Adjustment disorder with depressed mood    -  1      Care Instructions    -Please take the medicine in the morning every morning  -Please make sure you are eating at least 3 times per day  -Consider taking English lessons  -Please see couples therapy    -See me in 1 month          Follow-ups after your visit        Who to contact     If you have questions or need follow up information about today's clinic visit or your schedule please contact [facility] directly at [phone].  Normal or non-critical lab and imaging results will be communicated to you by MyChart, letter or phone within 4 business days after the clinic has received the results. If you do not hear from us within 7 days, please contact the clinic through MyChart or phone. If you have a critical or abnormal lab result, we will notify you by phone as soon as possible.  Submit refill requests through [employer] or call your pharmacy and they will forward the refill request to us. Please allow 3 business days for your refill to be completed.          Additional Information About Your Visit        MyChart Information     [employer] lets you send messages to your doctor, view your test results, renew your prescriptions, schedule appointments and more. To sign up, go to [email or web address] . Click on \"Log in\" on the left side of the screen, which will take you to the Welcome page. Then click on \"Sign up Now\" on the right side of the page.     You will be asked to enter the access code listed below, as well as some personal information. Please follow the directions to create your username and password.     Your access " "code is: [email or web address]  Expires: 2018  1:27 PM     Your access code will  in 90 days. If you need help or a new code, please call your [place] clinic or [phone].        Care EveryWhere ID     This is your Care EveryWhere ID. This could be used by other organizations to access your [facility] medical records  [id number]        Your Vitals Were     Pulse Temperature Height Last Period Pulse Oximetry BMI (Body Mass Index)    88 97.3  F (36.3  C) (Tympanic) 5' 0.5\" (1.537 m) 2017 (Exact Date) 99% 26.78 kg/m2       Blood Pressure from Last 3 Encounters:   17 112/70   17 102/62   10/09/17 98/62    Weight from Last 3 Encounters:   17 139 lb 6.4 oz (63.2 kg)   17 139 lb 11.2 oz (63.4 kg)   10/09/17 138 lb 11.2 oz (62.9 kg)              Today, you had the following     No orders found for display         Today's Medication Changes          These changes are accurate as of: 17 11:14 AM.  If you have any questions, ask your nurse or doctor.               Start taking these medicines.        Dose/Directions    escitalopram 5 MG tablet   Commonly known as:  LEXAPRO   Used for:  Adjustment disorder with depressed mood   Started by:  Cyndi Chamberlain APRN CNP        Dose:  5 mg   Take 1 tablet (5 mg) by mouth daily   Quantity:  60 tablet   Refills:  0            Where to get your medicines      These medications were sent to [id number] - [place] - [address]  [address]  [address] [place] [address]     Phone:  [phone]     escitalopram 5 MG tablet                Primary Care Provider Office Phone # Fax #    ARABELLA Rothman [phone] [address] [place] [address]        Equal Access to Services     [facility] AH: Hadii pro Ruiz, almada luqjosias, qaybta madelinealmada adeeglorenzo lino. So [facility] " [phone].    ATENCIÓN: Si jessica stafford, tiene a dow disposición servicios gratuitos de asistencia lingüística. Johnny alejandro [phone].    We comply with applicable federal civil rights laws and [place] laws. We do not discriminate on the basis of race, color, national origin, age, disability, sex, sexual orientation, or gender identity.            Thank you!     Thank you for choosing [facility] [place]  for your care. Our goal is always to provide you with excellent care. Hearing back from our patients is one way we can continue to improve our services. Please take a few minutes to complete the written survey that you may receive in the mail after your visit with us. Thank you!             Your Updated Medication List - Protect others around you: Learn how to safely use, store and throw away your medicines at www.disposemymeds.org.          This list is accurate as of: 12/18/17 11:14 AM.  Always use your most recent med list.                   Brand Name Dispense Instructions for use Diagnosis    escitalopram 5 MG tablet    LEXAPRO    60 tablet    Take 1 tablet (5 mg) by mouth daily    Adjustment disorder with depressed mood       ibuprofen 800 MG tablet    ADVIL/MOTRIN    90 tablet    Take 1 tablet (800 mg) by mouth every 8 hours as needed for moderate pain    Painful menstrual periods       levothyroxine 100 MCG tablet    SYNTHROID/LEVOTHROID    90 tablet    Take 1 tablet (100 mcg) by mouth daily    Hypothyroidism, unspecified type

## 2017-12-18 NOTE — NURSING NOTE
"Chief Complaint   Patient presents with     RECHECK       Initial /70 (Cuff Size: Adult Regular)  Pulse 88  Temp 97.3  F (36.3  C) (Tympanic)  Ht 5' 0.5\" (1.537 m)  Wt 139 lb 6.4 oz (63.2 kg)  LMP 12/08/2017 (Exact Date)  SpO2 99%  BMI 26.78 kg/m2 Estimated body mass index is 26.78 kg/(m^2) as calculated from the following:    Height as of this encounter: 5' 0.5\" (1.537 m).    Weight as of this encounter: 139 lb 6.4 oz (63.2 kg).  Medication Reconciliation: complete   Delia Wylie CMA    "

## 2018-04-10 ENCOUNTER — TELEPHONE (OUTPATIENT)
Dept: PEDIATRICS | Facility: CLINIC | Age: 33
End: 2018-04-10

## 2018-04-10 DIAGNOSIS — F32.9 SINGLE CURRENT EPISODE OF MAJOR DEPRESSIVE DISORDER, UNSPECIFIED DEPRESSION EPISODE SEVERITY: Primary | ICD-10-CM

## 2018-04-10 NOTE — LETTER
April 16, 2018      Ene Ng  [address] [place]  [place] [address]        Dear Ene,       We care about your health and have reviewed your health plan including your medical conditions, medications, and lab results.  Based on this review, it is recommended that you follow up regarding the following health topic(s):  -Depression    We recommend you take the following action(s):  -schedule a FOLLOWUP APPOINTMENT.     Please call us at the [facility] - [phone] (or use [email or web address]) to address the above recommendations.     Thank you for trusting [facility] and we appreciate the opportunity to serve you.  We look forward to supporting your healthcare needs in the future.    Healthy Regards,    Your Health Care Team  [facility]      Sincerely,    ARABELLA Brizuela CNP

## 2018-04-10 NOTE — TELEPHONE ENCOUNTER
Panel Management Review      Patient has the following on her problem list:     Depression / Dysthymia review    Measure:  Needs PHQ-9 score of 4 or less during index window.  Administer PHQ-9 and if score is 5 or more, send encounter to provider for next steps.    5 - 7 month window range:     PHQ-9 SCORE 6/27/2017 10/9/2017 12/12/2017   Total Score 10 15 17       If PHQ-9 recheck is 5 or more, route to provider for next steps.    Patient is due for:  DAP      Composite cancer screening  Chart review shows that this patient is due/due soon for the following None  Summary:    Patient is due/failing the following:   DAP    Action needed:   Patient needs office visit for depression follow up.    Type of outreach:    Phone, left message for patient to call back.     Questions for provider review:    None                                                                                                                                  Delia Wylie CMA    Chart routed to Care Team .

## 2018-04-10 NOTE — LETTER
My Depression Action Plan  Name: Ene Ng   Date of Birth 1985  Date: 4/23/2018    My doctor: Cyndi Chamberlain   My clinic: [address]  [address]  [facility] [address] [phone]          GREEN    ZONE   Good Control    What it looks like:     Things are going generally well. You have normal up s and down s. You may even feel depressed from time to time, but bad moods usually last less than a day.   What you need to do:  1. Continue to care for yourself (see self care plan)  2. Check your depression survival kit and update it as needed  3. Follow your physician s recommendations including any medication.  4. Do not stop taking medication unless you consult with your physician first.           YELLOW         ZONE Getting Worse    What it looks like:     Depression is starting to interfere with your life.     It may be hard to get out of bed; you may be starting to isolate yourself from others.    Symptoms of depression are starting to last most all day and this has happened for several days.     You may have suicidal thoughts but they are not constant.   What you need to do:     1. Call your care team, your response to treatment will improve if you keep your care team informed of your progress. Yellow periods are signs an adjustment may need to be made.     2. Continue your self-care, even if you have to fake it!    3. Talk to someone in your support network    4. Open up your depression survival kit           RED    ZONE Medical Alert - Get Help    What it looks like:     Depression is seriously interfering with your life.     You may experience these or other symptoms: You can t get out of bed most days, can t work or engage in other necessary activities, you have trouble taking care of basic hygiene, or basic responsibilities, thoughts of suicide or death that will not go away, self-injurious behavior.     What you need to do:  1. Call  your care team and request a same-day appointment. If they are not available (weekends or after hours) call your local crisis line, emergency room or 911.            Depression Self Care Plan / Survival Kit    Self-Care for Depression  Here s the deal. Your body and mind are really not as separate as most people think.  What you do and think affects how you feel and how you feel influences what you do and think. This means if you do things that people who feel good do, it will help you feel better.  Sometimes this is all it takes.  There is also a place for medication and therapy depending on how severe your depression is, so be sure to consult with your medical provider and/ or Behavioral Health Consultant if your symptoms are worsening or not improving.     In order to better manage my stress, I will:    Exercise  Get some form of exercise, every day. This will help reduce pain and release endorphins, the  feel good  chemicals in your brain. This is almost as good as taking antidepressants!  This is not the same as joining a gym and then never going! (they count on that by the way ) It can be as simple as just going for a walk or doing some gardening, anything that will get you moving.      Hygiene   Maintain good hygiene (Get out of bed in the morning, Make your bed, Brush your teeth, Take a shower, and Get dressed like you were going to work, even if you are unemployed).  If your clothes don't fit try to get ones that do.    Diet  I will strive to eat foods that are good for me, drink plenty of water, and avoid excessive sugar, caffeine, alcohol, and other mood-altering substances.  Some foods that are helpful in depression are: complex carbohydrates, B vitamins, flaxseed, fish or fish oil, fresh fruits and vegetables.    Psychotherapy  I agree to participate in Individual Therapy (if recommended).    Medication  If prescribed medications, I agree to take them.  Missing doses can result in serious side effects.   I understand that drinking alcohol, or other illicit drug use, may cause potential side effects.  I will not stop my medication abruptly without first discussing it with my provider.    Staying Connected With Others  I will stay in touch with my friends, family members, and my primary care provider/team.    Use your imagination  Be creative.  We all have a creative side; it doesn t matter if it s oil painting, sand castles, or mud pies! This will also kick up the endorphins.    Witness Beauty  (AKA stop and smell the roses) Take a look outside, even in mid-winter. Notice colors, textures. Watch the squirrels and birds.     Service to others  Be of service to others.  There is always someone else in need.  By helping others we can  get out of ourselves  and remember the really important things.  This also provides opportunities for practicing all the other parts of the program.    Humor  Laugh and be silly!  Adjust your TV habits for less news and crime-drama and more comedy.    Control your stress  Try breathing deep, massage therapy, biofeedback, and meditation. Find time to relax each day.     My support system    Clinic Contact:  Phone number:    Contact 1:  Phone number:    Contact 2:  Phone number:    [employer]/:  Phone number:    Therapist:  Phone number:    [facility] crisis center:    Phone number:    Other community support:  Phone number:

## 2019-06-17 PROBLEM — F32.9 CURRENT EPISODE OF MAJOR DEPRESSIVE DISORDER WITHOUT PRIOR EPISODE: Status: ACTIVE | Noted: 2017-10-09
